# Patient Record
Sex: FEMALE | Race: BLACK OR AFRICAN AMERICAN | NOT HISPANIC OR LATINO | Employment: STUDENT | URBAN - METROPOLITAN AREA
[De-identification: names, ages, dates, MRNs, and addresses within clinical notes are randomized per-mention and may not be internally consistent; named-entity substitution may affect disease eponyms.]

---
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
## 2023-02-14 ENCOUNTER — TELEPHONE (OUTPATIENT)
Dept: FAMILY MEDICINE | Facility: CLINIC | Age: 18
End: 2023-02-14
Payer: COMMERCIAL

## 2023-02-14 NOTE — TELEPHONE ENCOUNTER
Order/Referral Request    Who is requesting: Patrice Bradley     Orders being requested: Antibody testing Titer labs for vaccines     Reason service is needed/diagnosis: School requirement    When are orders needed by: 2/16/2023    Has this been discussed with Provider: No    Does patient have a preference on a Group/Provider/Facility? [facility]    Does patient have an appointment scheduled?: Yes: for labs    Where to send orders: Place orders within Epic    Could we send this information to you in [employer] or would you prefer to receive a phone call?:   Patient would prefer a phone call   Okay to leave a detailed message?: Yes at Cell number on file:    Telephone Information:   Mobile [phone]

## 2023-02-15 ENCOUNTER — DOCUMENTATION ONLY (OUTPATIENT)
Dept: LAB | Facility: CLINIC | Age: 18
End: 2023-02-15
Payer: COMMERCIAL

## 2023-02-15 NOTE — PROGRESS NOTES
Please see telephone call from yesterday. Patient at minimum would need an E visit with a copy of the form so we can order the correct tests.   Sharon Nunez PA-C

## 2023-02-15 NOTE — TELEPHONE ENCOUNTER
"\"Please see telephone call from yesterday. Patient at minimum would need an E visit with a copy of the form so we can order the correct tests.   Sharon Nunez PA-C\"    Team- Please assist patient in scheduling E-visit and have patient bring copy of form for requested titers.    Thanks,  STEPHANIE Francois RN  [facility]    "

## 2023-02-18 ENCOUNTER — MYC MEDICAL ADVICE (OUTPATIENT)
Dept: FAMILY MEDICINE | Facility: CLINIC | Age: 18
End: 2023-02-18
Payer: COMMERCIAL

## 2023-02-19 ENCOUNTER — E-VISIT (OUTPATIENT)
Dept: FAMILY MEDICINE | Facility: CLINIC | Age: 18
End: 2023-02-19
Payer: COMMERCIAL

## 2023-02-19 DIAGNOSIS — Z23 NEED FOR IMMUNIZATION AGAINST MEASLES: Primary | ICD-10-CM

## 2023-02-19 DIAGNOSIS — Z11.59 SCREENING FOR VIRAL DISEASE: ICD-10-CM

## 2023-02-19 PROCEDURE — 99421 OL DIG E/M SVC 5-10 MIN: CPT | Performed by: PHYSICIAN ASSISTANT

## 2023-02-20 ENCOUNTER — TELEPHONE (OUTPATIENT)
Dept: FAMILY MEDICINE | Facility: CLINIC | Age: 18
End: 2023-02-20
Payer: COMMERCIAL

## 2023-02-20 NOTE — TELEPHONE ENCOUNTER
Patient Quality Outreach    Patient is due for the following:   Physical Preventive Adult Physical      Topic Date Due     HPV Vaccine (2 - 3-dose series) 03/23/2020     COVID-19 Vaccine (3 - Booster for Pfizer series) 08/31/2021       Next Steps:   Patient has upcoming appointment, these items will be addressed at that time.     Type of outreach:    Chart review performed, no outreach needed.      Questions for provider review:    None     Tiffanie Winkler CMA  Chart routed to Care Team.

## 2023-02-21 ENCOUNTER — ALLIED HEALTH/NURSE VISIT (OUTPATIENT)
Dept: FAMILY MEDICINE | Facility: CLINIC | Age: 18
End: 2023-02-21
Payer: COMMERCIAL

## 2023-02-21 DIAGNOSIS — Z23 NEED FOR VACCINATION: Primary | ICD-10-CM

## 2023-02-21 PROCEDURE — 90707 MMR VACCINE SC: CPT

## 2023-02-21 PROCEDURE — 90471 IMMUNIZATION ADMIN: CPT

## 2023-02-21 PROCEDURE — 99207 PR NO CHARGE NURSE ONLY: CPT

## 2023-02-21 PROCEDURE — 90472 IMMUNIZATION ADMIN EACH ADD: CPT

## 2023-02-21 PROCEDURE — 90746 HEPB VACCINE 3 DOSE ADULT IM: CPT

## 2023-02-21 NOTE — PROGRESS NOTES
Prior to immunization administration, verified patients identity using patient s name and date of birth. Please see Immunization Activity for additional information.     Screening Questionnaire for Adult Immunization    Are you sick today?   No   Do you have allergies to medications, food, a vaccine component or latex?   No   Have you ever had a serious reaction after receiving a vaccination?   No   Do you have a long-term health problem with heart, lung, kidney, or metabolic disease (e.g., diabetes), asthma, a blood disorder, no spleen, complement component deficiency, a cochlear implant, or a spinal fluid leak?  Are you on long-term aspirin therapy?   No   Do you have cancer, leukemia, HIV/AIDS, or any other immune system problem?   No   Do you have a parent, brother, or sister with an immune system problem?   No   In the past 3 months, have you taken medications that affect  your immune system, such as prednisone, other steroids, or anticancer drugs; drugs for the treatment of rheumatoid arthritis, Crohn s disease, or psoriasis; or have you had radiation treatments?   No   Have you had a seizure, or a brain or other nervous system problem?   No   During the past year, have you received a transfusion of blood or blood    products, or been given immune (gamma) globulin or antiviral drug?   No   For women: Are you pregnant or is there a chance you could become       pregnant during the next month?   No   Have you received any vaccinations in the past 4 weeks?   No     Immunization questionnaire answers were all negative.        Per orders of Freddie Quinones NP, injection of MMR and HepB #3 given by Kaylen Evans CMA. Patient instructed to remain in clinic for 15 minutes afterwards, and to report any adverse reaction to me immediately.       Screening performed by Kaylen Evans CMA on 2/21/2023 at 10:01 AM.

## 2023-03-07 ENCOUNTER — OFFICE VISIT (OUTPATIENT)
Dept: FAMILY MEDICINE | Facility: CLINIC | Age: 18
End: 2023-03-07
Payer: COMMERCIAL

## 2023-03-07 VITALS
BODY MASS INDEX: 26.96 KG/M2 | DIASTOLIC BLOOD PRESSURE: 77 MMHG | HEART RATE: 86 BPM | TEMPERATURE: 98.7 F | OXYGEN SATURATION: 98 % | SYSTOLIC BLOOD PRESSURE: 111 MMHG | WEIGHT: 182 LBS | RESPIRATION RATE: 19 BRPM | HEIGHT: 69 IN

## 2023-03-07 DIAGNOSIS — R61 GENERALIZED HYPERHIDROSIS: ICD-10-CM

## 2023-03-07 DIAGNOSIS — Z00.00 ROUTINE HISTORY AND PHYSICAL EXAMINATION OF ADULT: Primary | ICD-10-CM

## 2023-03-07 DIAGNOSIS — Z28.39 IMMUNIZATION DEFICIENCY: ICD-10-CM

## 2023-03-07 PROCEDURE — 90651 9VHPV VACCINE 2/3 DOSE IM: CPT | Performed by: PHYSICIAN ASSISTANT

## 2023-03-07 PROCEDURE — 99213 OFFICE O/P EST LOW 20 MIN: CPT | Mod: 25 | Performed by: PHYSICIAN ASSISTANT

## 2023-03-07 PROCEDURE — 99395 PREV VISIT EST AGE 18-39: CPT | Mod: 25 | Performed by: PHYSICIAN ASSISTANT

## 2023-03-07 PROCEDURE — 90471 IMMUNIZATION ADMIN: CPT | Performed by: PHYSICIAN ASSISTANT

## 2023-03-07 ASSESSMENT — ENCOUNTER SYMPTOMS
HEADACHES: 0
NAUSEA: 0
PALPITATIONS: 0
ROS SKIN COMMENTS: EXCESSIVE SWEATING
JOINT SWELLING: 0
PARESTHESIAS: 0
CHILLS: 0
DIZZINESS: 0
WEAKNESS: 0
EYE PAIN: 0
SHORTNESS OF BREATH: 0
HEMATOCHEZIA: 0
FEVER: 0
DIARRHEA: 0
HEMATURIA: 0
NERVOUS/ANXIOUS: 0
MYALGIAS: 0
SORE THROAT: 0
COUGH: 0
HEARTBURN: 0
ARTHRALGIAS: 0
CONSTIPATION: 0
BREAST MASS: 0
DYSURIA: 0
ABDOMINAL PAIN: 0
FREQUENCY: 0

## 2023-03-07 NOTE — PROGRESS NOTES
SUBJECTIVE:   CC: Patrice is an 17 year old who presents for preventive health visit.     Patient has been advised of split billing requirements and indicates understanding: Yes  Healthy Habits:     Getting at least 3 servings of Calcium per day:  NO    Bi-annual eye exam:  Yes    Dental care twice a year:  NO    Sleep apnea or symptoms of sleep apnea:  None    Diet:  Regular (no restrictions) and Breakfast skipped    Frequency of exercise:  1 day/week    Duration of exercise:  30-45 minutes    Taking medications regularly:  Yes    Medication side effects:  None    PHQ-2 Total Score: 1    Additional concerns today:  No              Today's PHQ-2 Score:   PHQ-2 ( 1999 Pfizer) 3/7/2023   Q1: Little interest or pleasure in doing things 1   Q2: Feeling down, depressed or hopeless 0   PHQ-2 Score 1   PHQ-2 Total Score (12-17 Years)- Positive if 3 or more points; Administer PHQ-A if positive -   Q1: Little interest or pleasure in doing things Several days   Q2: Feeling down, depressed or hopeless Not at all   PHQ-2 Score 1       Have you ever done Advance Care Planning? (For example, a Health Directive, POLST, or a discussion with a medical provider or your loved ones about your wishes): No, advance care planning information given to patient to review.  Patient plans to discuss their wishes with loved ones or provider.      Social History     Tobacco Use     Smoking status: Never     Smokeless tobacco: Never   Substance Use Topics     Alcohol use: No         Alcohol Use 3/7/2023   Prescreen: >3 drinks/day or >7 drinks/week? Not Applicable   No flowsheet data found.    Reviewed orders with patient.  Reviewed health maintenance and updated orders accordingly - Yes  Labs reviewed in EPIC    Breast Cancer Screening:        History of abnormal Pap smear: NO - under age 21, PAP not appropriate for age     Reviewed and updated as needed this visit by clinical staff   Tobacco  Allergies  Meds    Surg Hx  Fam Hx       "    Reviewed and updated as needed this visit by Provider    Allergies     Surg Hx  Fam Hx             Review of Systems   Constitutional: Negative for chills and fever.   HENT: Positive for tinnitus. Negative for congestion, ear pain, hearing loss and sore throat.    Eyes: Negative for pain and visual disturbance.   Respiratory: Negative for cough and shortness of breath.    Cardiovascular: Negative for chest pain, palpitations and peripheral edema.   Gastrointestinal: Negative for abdominal pain, constipation, diarrhea, heartburn, hematochezia and nausea.   Breasts:  Negative for tenderness, breast mass and discharge.   Genitourinary: Positive for vaginal pain (skin around vaginal sensitive ). Negative for dysuria, frequency, genital sores, hematuria, pelvic pain, urgency, vaginal bleeding and vaginal discharge.   Musculoskeletal: Negative for arthralgias, joint swelling and myalgias.   Skin: Negative for rash.        excessive sweating    Neurological: Negative for dizziness, weakness, headaches and paresthesias.   Psychiatric/Behavioral: Negative for mood changes. The patient is not nervous/anxious.               OBJECTIVE:   /77   Pulse 86   Temp 98.7  F (37.1  C) (Oral)   Resp 19   Ht 1.74 m (5' 8.5\")   Wt 82.6 kg (182 lb)   LMP 02/11/2023   SpO2 98%   BMI 27.27 kg/m    Physical Exam  Constitutional:       General: She is not in acute distress.     Appearance: She is well-developed.   HENT:      Right Ear: Tympanic membrane, ear canal and external ear normal.      Left Ear: Tympanic membrane, ear canal and external ear normal.      Mouth/Throat:      Pharynx: No oropharyngeal exudate.   Eyes:      Conjunctiva/sclera: Conjunctivae normal.   Neck:      Thyroid: No thyromegaly.   Cardiovascular:      Rate and Rhythm: Normal rate and regular rhythm.      Heart sounds: Normal heart sounds.   Pulmonary:      Effort: Pulmonary effort is normal.      Breath sounds: Normal breath sounds.   Abdominal: "      General: Bowel sounds are normal.      Palpations: Abdomen is soft.      Tenderness: There is no abdominal tenderness.   Lymphadenopathy:      Cervical: No cervical adenopathy.   Skin:     General: Skin is warm and dry.      Findings: No rash.   Neurological:      Mental Status: She is alert and oriented to person, place, and time.   Psychiatric:         Behavior: Behavior normal.           Diagnostic Test Results:  Labs reviewed in Epic    ASSESSMENT/PLAN:   (Z00.00) Routine history and physical examination of adult  (primary encounter diagnosis)  Comment:   Plan: overall healthy.  Encouraged increase in exercise   Monitor tinnitus.       (Z28.39) Immunization deficiency  Comment:   Plan: Mumps Immune Status, IgG, Hepatitis B Surface         Antibody        Titers were negative before.  Got vaccines again.  Recheck in a month labs for school    (R61) Generalized hyperhidrosis  Comment:   Plan: aluminum chloride (DRYSOL) 20 % external         Solution  Apply to hands.          Follow up as needed            COUNSELING:  Reviewed preventive health counseling, as reflected in patient instructions       Regular exercise       Healthy diet/nutrition        She reports that she has never smoked. She has never used smokeless tobacco.          Carmencita Serrato PA-C  [facility]

## 2023-03-28 ENCOUNTER — LAB (OUTPATIENT)
Dept: LAB | Facility: CLINIC | Age: 18
End: 2023-03-28
Payer: COMMERCIAL

## 2023-03-28 DIAGNOSIS — Z28.39 IMMUNIZATION DEFICIENCY: ICD-10-CM

## 2023-03-28 DIAGNOSIS — Z11.3 SCREENING FOR STDS (SEXUALLY TRANSMITTED DISEASES): Primary | ICD-10-CM

## 2023-03-28 DIAGNOSIS — Z11.4 SCREENING FOR HIV (HUMAN IMMUNODEFICIENCY VIRUS): ICD-10-CM

## 2023-03-28 DIAGNOSIS — Z11.59 SCREENING FOR VIRAL DISEASE: ICD-10-CM

## 2023-03-28 DIAGNOSIS — Z11.59 NEED FOR HEPATITIS C SCREENING TEST: ICD-10-CM

## 2023-03-28 LAB
HBV SURFACE AB SERPL IA-ACNC: 330.05 M[IU]/ML
HBV SURFACE AB SERPL IA-ACNC: 337.86 M[IU]/ML
HBV SURFACE AB SERPL IA-ACNC: REACTIVE M[IU]/ML
HBV SURFACE AB SERPL IA-ACNC: REACTIVE M[IU]/ML
HCV AB SERPL QL IA: NONREACTIVE
HIV 1+2 AB+HIV1 P24 AG SERPL QL IA: NONREACTIVE
MUMPS ANTIBODY IGG INSTRUMENT VALUE: 49.7 AU/ML
MUV IGG SER QL IA: POSITIVE

## 2023-03-28 PROCEDURE — 86735 MUMPS ANTIBODY: CPT

## 2023-03-28 PROCEDURE — 86803 HEPATITIS C AB TEST: CPT

## 2023-03-28 PROCEDURE — 86706 HEP B SURFACE ANTIBODY: CPT

## 2023-03-28 PROCEDURE — 86765 RUBEOLA ANTIBODY: CPT

## 2023-03-28 PROCEDURE — 87389 HIV-1 AG W/HIV-1&-2 AB AG IA: CPT

## 2023-03-28 PROCEDURE — 36415 COLL VENOUS BLD VENIPUNCTURE: CPT

## 2023-03-28 PROCEDURE — 86706 HEP B SURFACE ANTIBODY: CPT | Mod: 59

## 2023-03-28 NOTE — RESULT ENCOUNTER NOTE
Adna,    You are immune to hepatitis B and mumps from prior vaccination.     Best,  Kamilah Geiger MD

## 2023-03-29 ENCOUNTER — TELEPHONE (OUTPATIENT)
Dept: FAMILY MEDICINE | Facility: CLINIC | Age: 18
End: 2023-03-29

## 2023-03-29 ENCOUNTER — E-VISIT (OUTPATIENT)
Dept: FAMILY MEDICINE | Facility: CLINIC | Age: 18
End: 2023-03-29
Payer: COMMERCIAL

## 2023-03-29 DIAGNOSIS — Z23 NEED FOR VACCINATION: Primary | ICD-10-CM

## 2023-03-29 LAB
MEV IGG SER IA-ACNC: 10.7 AU/ML
MEV IGG SER IA-ACNC: NORMAL

## 2023-03-29 PROCEDURE — 99421 OL DIG E/M SVC 5-10 MIN: CPT | Performed by: FAMILY MEDICINE

## 2023-03-29 NOTE — RESULT ENCOUNTER NOTE
Adna,     Your HIV and Hepatitis C screenings were negative.   Your are not immune to Measles  You are immune to Hepatitis B.   Sharon Nunez PA-C

## 2023-03-29 NOTE — TELEPHONE ENCOUNTER
"Patient had submitted an Evisit today to get a referral to immunologist-    \"I keep testing not immune on the measles titer even though i got a vaccine  i was wondering ir i could get a referral to see an immunologist\"    Kamilah Engel wrote a letter for patient regarding being fully vaccinated against MMR.    Patient would like referral to immunologist  Please advise    Holger Razo RN  [facility] [place]        "

## 2023-03-29 NOTE — PATIENT INSTRUCTIONS
Patrice,    Thank you for choosing us for your care. I have reviewed your chart and see that you are considered fully vaccinated against MMR. I have created a letter for you to present to your school or requiring body.     Click here to access your letters. [email or web address]  (If unable to click the link, copy and paste the text into your browser)     It is possible that we have not waited long enough since your last booster shot to see your vaccine titer turn positive, so I have placed a lab order for you to have your lab retested.     I have placed the orders. Please schedule an appointment with the lab right here in [employer], or call [phone].     Kamilah Geiger MD

## 2023-03-29 NOTE — LETTER
3/29/2023          Patrice Bradley  [address] [address]    [id number]    To whom is may concern,    Patrice Bradley  2005 is a patient of our clinic and is fully immunized against MMR, with prior vaccine dates of 04, 08, and 23.     Sincerely,        Kamilah Geiger MD  Dept of Family Practice

## 2023-03-30 ENCOUNTER — E-VISIT (OUTPATIENT)
Dept: FAMILY MEDICINE | Facility: CLINIC | Age: 18
End: 2023-03-30
Payer: COMMERCIAL

## 2023-03-30 DIAGNOSIS — Z28.39 IMMUNIZATION DEFICIENCY: Primary | ICD-10-CM

## 2023-03-30 PROCEDURE — 99421 OL DIG E/M SVC 5-10 MIN: CPT | Performed by: PHYSICIAN ASSISTANT

## 2023-04-01 NOTE — TELEPHONE ENCOUNTER
DIAGNOSIS: per patient Immunization deficiency [Z28.39] Dr Nunez records in [facility]   DATE RECEIVED: 4.5.23   NOTES (Gather within 2 years) STATUS DETAILS   OFFICE NOTE from referring provider   Internal 3.30.23, 2.19.23  Mayra  FP   OFFICE NOTE from other specialist Internal 3.7.23  Ama MCGARRY   DISCHARGE SUMMARY from hospital     DISCHARGE REPORT from the ER     LABS (any labs) Internal    MEDICATION LIST Internal    IMAGING  (NEED IMAGES AND REPORTS)     Osteomyelitis: Foot imaging      Liver Abscess: Abdominal imagineg     Other (anything related to diagnoses

## 2023-04-05 ENCOUNTER — TELEPHONE (OUTPATIENT)
Dept: INFECTIOUS DISEASES | Facility: CLINIC | Age: 18
End: 2023-04-05
Payer: COMMERCIAL

## 2023-04-05 ENCOUNTER — PRE VISIT (OUTPATIENT)
Dept: INFECTIOUS DISEASES | Facility: CLINIC | Age: 18
End: 2023-04-05
Payer: COMMERCIAL

## 2023-04-05 NOTE — TELEPHONE ENCOUNTER
Called patient to discuss. Patient upset as she needed a letter provided today by a provider. Apologized for the error and provided her with correct number to schedule.

## 2023-04-05 NOTE — TELEPHONE ENCOUNTER
----- Message from Randy Link MD sent at 2023  6:53 PM CDT -----  Regardin23 Clinic schedule  Hi I was reviewing my schedule for 23 and saw that the patient Patrice Bradley at 1pm was referred to see an immunologist, not an ID (infectious diseases) referral. They will either need to be rescheduled with an allergy/immunology provider or with an immunologist (ie [facility], etc) as I am not am immunologist and thus not the appropriate provider to see her.     Thanks,  Valdemar Link

## 2023-04-20 ENCOUNTER — LAB (OUTPATIENT)
Dept: LAB | Facility: CLINIC | Age: 18
End: 2023-04-20
Payer: COMMERCIAL

## 2023-04-20 DIAGNOSIS — R76.9 ABNORMAL IMMUNOLOGICAL FINDING IN SERUM: Primary | ICD-10-CM

## 2023-04-20 DIAGNOSIS — R76.9 ABNORMAL IMMUNOLOGICAL FINDING IN SERUM: ICD-10-CM

## 2023-04-20 DIAGNOSIS — Z23 NEED FOR VACCINATION: ICD-10-CM

## 2023-04-20 LAB
BASOPHILS # BLD AUTO: 0 10E3/UL (ref 0–0.2)
BASOPHILS NFR BLD AUTO: 0 %
EOSINOPHIL # BLD AUTO: 0.1 10E3/UL (ref 0–0.7)
EOSINOPHIL NFR BLD AUTO: 1 %
ERYTHROCYTE [DISTWIDTH] IN BLOOD BY AUTOMATED COUNT: 14.9 % (ref 10–15)
HCT VFR BLD AUTO: 39.3 % (ref 35–47)
HGB BLD-MCNC: 12.7 G/DL (ref 11.7–15.7)
LYMPHOCYTES # BLD AUTO: 1.3 10E3/UL (ref 0.8–5.3)
LYMPHOCYTES NFR BLD AUTO: 26 %
MCH RBC QN AUTO: 28 PG (ref 26.5–33)
MCHC RBC AUTO-ENTMCNC: 32.3 G/DL (ref 31.5–36.5)
MCV RBC AUTO: 87 FL (ref 78–100)
MONOCYTES # BLD AUTO: 0.4 10E3/UL (ref 0–1.3)
MONOCYTES NFR BLD AUTO: 7 %
NEUTROPHILS # BLD AUTO: 3.3 10E3/UL (ref 1.6–8.3)
NEUTROPHILS NFR BLD AUTO: 65 %
PLATELET # BLD AUTO: 357 10E3/UL (ref 150–450)
RBC # BLD AUTO: 4.53 10E6/UL (ref 3.8–5.2)
WBC # BLD AUTO: 5.1 10E3/UL (ref 4–11)

## 2023-04-20 PROCEDURE — 82787 IGG 1 2 3 OR 4 EACH: CPT

## 2023-04-20 PROCEDURE — 86317 IMMUNOASSAY INFECTIOUS AGENT: CPT | Mod: 90

## 2023-04-20 PROCEDURE — 82784 ASSAY IGA/IGD/IGG/IGM EACH: CPT

## 2023-04-20 PROCEDURE — 86787 VARICELLA-ZOSTER ANTIBODY: CPT | Mod: 90

## 2023-04-20 PROCEDURE — 86765 RUBEOLA ANTIBODY: CPT | Mod: 90

## 2023-04-20 PROCEDURE — 85025 COMPLETE CBC W/AUTO DIFF WBC: CPT

## 2023-04-20 PROCEDURE — 86787 VARICELLA-ZOSTER ANTIBODY: CPT | Mod: 59

## 2023-04-20 PROCEDURE — 99000 SPECIMEN HANDLING OFFICE-LAB: CPT

## 2023-04-20 PROCEDURE — 36415 COLL VENOUS BLD VENIPUNCTURE: CPT

## 2023-04-21 LAB
IGA SERPL-MCNC: 168 MG/DL (ref 84–499)
IGG SERPL-MCNC: 1448 MG/DL (ref 610–1616)
IGG1 SER-MCNC: 724 MG/DL (ref 382–929)
IGG2 SER-MCNC: 540 MG/DL (ref 242–700)
IGG3 SER-MCNC: 69 MG/DL (ref 22–176)
IGG4 SER-MCNC: 20 MG/DL (ref 4–86)
IGM SERPL-MCNC: 62 MG/DL (ref 35–242)
SUBCLASSES, PERCENT: 93 %
VZV IGG SER QL IA: 436.1 INDEX
VZV IGG SER QL IA: POSITIVE

## 2023-04-24 LAB
C DIPHTHERIAE IGG SER-ACNC: 0.9 IU/ML
C TETANI TOXOID IGG SERPL IA-ACNC: 1 IU/ML
MEV IGM SER IA-ACNC: 0.42 AU
S PN DA SERO 19F IGG SER-MCNC: 26.15 UG/ML
S PNEUM DA 1 IGG SER-MCNC: 9.5 UG/ML
S PNEUM DA 12F IGG SER-MCNC: 0.35 UG/ML
S PNEUM DA 14 IGG SER-MCNC: 14.44 UG/ML
S PNEUM DA 18C IGG SER-MCNC: 2.29 UG/ML
S PNEUM DA 23F IGG SER-MCNC: 1.2 UG/ML
S PNEUM DA 3 IGG SER-MCNC: 1.52 UG/ML
S PNEUM DA 4 IGG SER-MCNC: 0.51 UG/ML
S PNEUM DA 5 IGG SER-MCNC: 0.74 UG/ML
S PNEUM DA 6B IGG SER-MCNC: 0.9 UG/ML
S PNEUM DA 7F IGG SER-MCNC: 2.09 UG/ML
S PNEUM DA 8 IGG SER-MCNC: 0.45 UG/ML
S PNEUM DA 9N IGG SER-MCNC: 3 UG/ML
S PNEUM DA 9V IGG SER-MCNC: 0.22 UG/ML
S PNEUM SEROTYPE IGG SER-IMP: NORMAL

## 2023-04-25 LAB — VZV IGM SER IA-ACNC: 0.05 ISR

## 2023-05-22 NOTE — PROGRESS NOTES
"Patrice Bradley is a 14 year old female who is being evaluated via a billable telephone visit.      The parent/guardian has been notified of following:     \"This telephone visit will be conducted via a call between you, your child and your child's physician/provider. We have found that certain health care needs can be provided without the need for a physical exam.  This service lets us provide the care you need with a short phone conversation.  If a prescription is necessary we can send it directly to your pharmacy.  If lab work is needed we can place an order for that and you can then stop by our lab to have the test done at a later time.    Telephone visits are billed at different rates depending on your insurance coverage. During this emergency period, for some insurers they may be billed the same as an in-person visit.  Please reach out to your insurance provider with any questions.    If during the course of the call the physician/provider feels a telephone visit is not appropriate, you will not be charged for this service.\"    Parent/guardian has given verbal consent for Telephone visit?  Yes    What phone number would you like to be contacted at? [phone]    How would you like to obtain your AVS? Mail a copy    Phone call duration: 21 minutes    Patrice Bradley is a 14 year old female who c/o painful periods.  Her mother, Ms. Alexandra, joins us on today's call.  Menarche age 9, regular menses.  She describes \"bad cramps\", nausea and vomiting (has tried zofran but vomits anyway and then cannot keep ibuprofen/naproxen down), becomes constipated.  Not sexually active.  No risk factors to preclude use of oral contraceptive pills.     We discussed dysmenorrhea, options of treatment.  We discussed risks/benefits of hormonal contraception, including oral contraceptive pills and Depo-provera.  Questions answered.  She and her mother wish that she begin a trial of oral contraceptive pills.  She will start with her next " period, monitor symptoms, and call as needed.  Zofran also refilled at her request.     ASSESSMENT: Dysmenorrhea.  Nausea.     PLAN: As above.     Jackeline Recinos MD        no

## 2023-06-27 ENCOUNTER — OFFICE VISIT (OUTPATIENT)
Dept: FAMILY MEDICINE | Facility: CLINIC | Age: 18
End: 2023-06-27
Payer: COMMERCIAL

## 2023-06-27 VITALS
TEMPERATURE: 98.3 F | DIASTOLIC BLOOD PRESSURE: 64 MMHG | SYSTOLIC BLOOD PRESSURE: 97 MMHG | BODY MASS INDEX: 25.92 KG/M2 | HEART RATE: 97 BPM | HEIGHT: 69 IN | OXYGEN SATURATION: 99 % | RESPIRATION RATE: 19 BRPM | WEIGHT: 175 LBS

## 2023-06-27 DIAGNOSIS — R11.0 NAUSEA: ICD-10-CM

## 2023-06-27 DIAGNOSIS — R10.2 PELVIC PAIN IN FEMALE: ICD-10-CM

## 2023-06-27 DIAGNOSIS — N94.6 DYSMENORRHEA: ICD-10-CM

## 2023-06-27 DIAGNOSIS — Z00.00 ROUTINE HISTORY AND PHYSICAL EXAMINATION OF ADULT: Primary | ICD-10-CM

## 2023-06-27 PROBLEM — R11.10 REGURGITATION OF FOOD: Status: RESOLVED | Noted: 2021-01-18 | Resolved: 2023-06-27

## 2023-06-27 PROCEDURE — 99395 PREV VISIT EST AGE 18-39: CPT | Performed by: PHYSICIAN ASSISTANT

## 2023-06-27 RX ORDER — METOCLOPRAMIDE 5 MG/1
5 TABLET ORAL 4 TIMES DAILY PRN
Qty: 30 TABLET | Refills: 11 | Status: SHIPPED | OUTPATIENT
Start: 2023-06-27 | End: 2024-06-28

## 2023-06-27 RX ORDER — NAPROXEN 250 MG/1
250-500 TABLET ORAL 2 TIMES DAILY PRN
Qty: 90 TABLET | Refills: 4 | Status: SHIPPED | OUTPATIENT
Start: 2023-06-27

## 2023-06-27 RX ORDER — VITAMIN B COMPLEX
2 TABLET ORAL DAILY
COMMUNITY

## 2023-06-27 SDOH — ECONOMIC STABILITY: TRANSPORTATION INSECURITY
IN THE PAST 12 MONTHS, HAS THE LACK OF TRANSPORTATION KEPT YOU FROM MEDICAL APPOINTMENTS OR FROM GETTING MEDICATIONS?: NO

## 2023-06-27 SDOH — ECONOMIC STABILITY: FOOD INSECURITY: WITHIN THE PAST 12 MONTHS, THE FOOD YOU BOUGHT JUST DIDN'T LAST AND YOU DIDN'T HAVE MONEY TO GET MORE.: NEVER TRUE

## 2023-06-27 SDOH — ECONOMIC STABILITY: FOOD INSECURITY: WITHIN THE PAST 12 MONTHS, YOU WORRIED THAT YOUR FOOD WOULD RUN OUT BEFORE YOU GOT MONEY TO BUY MORE.: NEVER TRUE

## 2023-06-27 SDOH — ECONOMIC STABILITY: INCOME INSECURITY: IN THE LAST 12 MONTHS, WAS THERE A TIME WHEN YOU WERE NOT ABLE TO PAY THE MORTGAGE OR RENT ON TIME?: NO

## 2023-06-27 NOTE — PROGRESS NOTES
Preventive Care Visit  [facility] REYNA Serrato PA-C, Family Medicine  Jun 27, 2023  {Provider  Link to [facility] SmartSet :670476}  Assessment & Plan   17 year old, here for preventive care.    {Diagnosis Options:374292}  Patient has been advised of split billing requirements and indicates understanding: Yes  Growth      {GROWTH:160591}    Immunizations   {Vaccine counseling is expected when vaccines are given for the first time.   Vaccine counseling would not be expected for subsequent vaccines (after the first of the series) unless there is significant additional documentation:872209}MenB Vaccine {MenB Vaccine:491663}    Anticipatory Guidance    Reviewed age appropriate anticipatory guidance.   {ANTICIPATORY 15-18 Y (Optional):234646}  {Link to Communication Management (Letters) :624335}  {Cleared for sports (Optional):847260}    Referrals/Ongoing Specialty Care  {Referrals/Ongoing Specialty Care:237696}  Verbal Dental Referral: {C&TC REQUIRED at eruption of first tooth or 12 mo:444876}    Dyslipidemia Follow Up:  { :686901}    Subjective     ***       No data to display                  6/27/2023    11:18 AM   Social   Lives with Family   Recent potential stressors None   History of trauma No   Family Hx of mental health challenges No   Lack of transportation has limited access to appts/meds No   Difficulty paying mortgage/rent on time No   Lack of steady place to sleep/has slept in a shelter No         6/27/2023    11:18 AM   Health Risks/Safety   Do you always wear a seat belt? Yes   Helmet use? Yes            6/27/2023    11:18 AM   TB Screening: Consider immunosuppression as a risk factor for TB   Recent TB infection or positive TB test in family/close contacts No   Recent travel outside [place] (you/family/close contacts) No   Recent residence in high-risk group setting (correctional facility/health care facility/homeless shelter/refugee camp) No          6/27/2023    11:18 AM    Dyslipidemia   FH: premature cardiovascular disease No, these conditions are not present in the patient's biologic parents or grandparents   FH: hyperlipidemia (!) YES   Personal risk factors for heart disease NO diabetes, high blood pressure, obesity, smokes cigarettes, kidney problems, heart or kidney transplant, history of Kawasaki disease with an aneurysm, lupus, rheumatoid arthritis, or HIV     Recent Labs   Lab Test 05/24/22  1411   CHOL 186*   HDL 67      TRIG 51     {Universal Screening with fasting or non-fasting lipid panel recommended once between 17-21 yrs old  Link to Expert Panel on Integrated Guidelines for Cardiovascular Health and Risk Reduction in Children and Adolescents Summary Report :145322}      6/27/2023    11:18 AM   Diet   What type of water? (!) BOTTLED   In past 12 months, concerned food might run out Never true   In past 12 months, food has run out/couldn't afford more Never true         6/27/2023    11:18 AM   Diet   Do you have questions about your eating?  No   Do you have questions about your weight?  No   What do you regularly drink? Water   What type of water? (!) BOTTLED   Do you think you eat healthy foods? Yes   At least 3 servings of food or beverages that have calcium each day? Yes   How would you describe your diet?  No restrictions    (!) BREAKFAST SKIPPED   In past 12 months, concerned food might run out Never true   In past 12 months, food has run out/couldn't afford more Never true         6/27/2023    11:18 AM   Activity   Days per week of moderate/strenuous exercise 1 day   On average, how many minutes do you engage in exercise at this level? 60 minutes   What do you do for exercise? swim   What activities are you involved with? n/a         6/27/2023    11:18 AM   Media Use   Hours per day of screen time (for entertainment) 2         6/27/2023    11:18 AM   Sleep   Do you have any trouble with sleep? No         6/27/2023    11:18 AM   School   Are you in  "school? Yes   What school do you attend?  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  Regular          Objective     Exam  BP 97/64   Pulse 97   Temp 98.3  F (36.8  C) (Oral)   Resp 19   Ht 1.74 m (5' 8.5\")   Wt 79.4 kg (175 lb)   LMP 06/10/2023   SpO2 99%   BMI 26.22 kg/m    95 %ile (Z= 1.65) based on CDC (Girls, 2-20 Years) Stature-for-age data based on Stature recorded on 6/27/2023.  93 %ile (Z= 1.49) based on CDC (Girls, 2-20 Years) weight-for-age data using vitals from 6/27/2023.  84 %ile (Z= 1.00) based on CDC (Girls, 2-20 Years) BMI-for-age based on BMI available as of 6/27/2023.  Blood pressure %rhonda are not available for patients who are 18 years or older.    Vision Screen  Vision Screen Details  Reason Vision Screen Not Completed: Patient had exam in last 12 months    Hearing Screen  RIGHT EAR  1000 Hz on Level 40 dB (Conditioning sound): Pass  1000 Hz on Level 20 dB: Pass  2000 Hz on Level 20 dB: Pass  4000 Hz on Level 20 dB: Pass  6000 Hz on Level 20 dB: Pass  8000 Hz on Level 20 dB: Pass  LEFT EAR  8000 Hz on Level 20 dB: Pass  6000 Hz on Level 20 dB: Pass  4000 Hz on Level 20 dB: Pass  2000 Hz on Level 20 dB: Pass  1000 Hz on Level 20 dB: Pass  500 Hz on Level 25 dB: Pass  RIGHT EAR  500 Hz on Level 25 dB: Pass  Results  Hearing Screen Results: Pass  {Provider  View Vision and Hearing Results :020584}  {Reference  Recommended Vision and Hearing Follow-Up :681458}  Physical Exam  {TEEN GENERAL EXAM 9 - 18 Y:269435::\"GENERAL: Active, alert, in no acute distress.\",\"SKIN: Clear. No significant rash, abnormal " "pigmentation or lesions\",\"HEAD: Normocephalic\",\"EYES: Pupils equal, round, reactive, Extraocular muscles intact. Normal conjunctivae.\",\"EARS: Normal canals. Tympanic membranes are normal; gray and translucent.\",\"NOSE: Normal without discharge.\",\"MOUTH/THROAT: Clear. No oral lesions. Teeth without obvious abnormalities.\",\"NECK: Supple, no masses.  No thyromegaly.\",\"LYMPH NODES: No adenopathy\",\"LUNGS: Clear. No rales, rhonchi, wheezing or retractions\",\"HEART: Regular rhythm. Normal S1/S2. No murmurs. Normal pulses.\",\"ABDOMEN: Soft, non-tender, not distended, no masses or hepatosplenomegaly. Bowel sounds normal. \",\"NEUROLOGIC: No focal findings. Cranial nerves grossly intact: DTR's normal. Normal gait, strength and tone\",\"BACK: Spine is straight, no scoliosis.\",\"EXTREMITIES: Full range of motion, no deformities\"}  { EXAM- Documentation REQUIRED for C&TC:648864}  {Sports Exam Musculoskeletal (Optional):993120::\" \",\"No Marfan stigmata: kyphoscoliosis, high-arched palate, pectus excavatuM, arachnodactyly, arm span > height, hyperlaxity, myopia, MVP, aortic insufficieny)\",\"Eyes: normal fundoscopic and pupils\",\"Cardiovascular: normal PMI, simultaneous femoral/radial pulses, no murmurs (standing, supine, Valsalva)\",\"Skin: no HSV, MRSA, tinea corporis\",\"Musculoskeletal\",\"  Neck: normal\",\"  Back: normal\",\"  Shoulder/arm: normal\",\"  Elbow/forearm: normal\",\"  Wrist/hand/fingers: normal\",\"  Hip/thigh: normal\",\"  Knee: normal\",\"  Leg/ankle: normal\",\"  Foot/toes: normal\",\"  Functional (Single Leg Hop or Squat): normal\"}    Prior to immunization administration, verified patients identity using patient s name and date of birth. Please see Immunization Activity for additional information.     Screening Questionnaire for Adult Immunization     Are you sick today?   { :352104::\"No\"}   Do you have allergies to medications, food, a vaccine component or latex?   { :546657::\"No\"}   Have you ever had a serious reaction after receiving a " "vaccination?   { :260205::\"No\"}   Do you have a long-term health problem with heart, lung, kidney, or metabolic disease (e.g., diabetes), asthma, a blood disorder, no spleen, complement component deficiency, a cochlear implant, or a spinal fluid leak?  Are you on long-term aspirin therapy?   { :020882::\"No\"}   Do you have cancer, leukemia, HIV/AIDS, or any other immune system problem?   { :858717::\"No\"}   Do you have a parent, brother, or sister with an immune system problem?   { :346912::\"No\"}   In the past 3 months, have you taken medications that affect  your immune system, such as prednisone, other steroids, or anticancer drugs; drugs for the treatment of rheumatoid arthritis, Crohn s disease, or psoriasis; or have you had radiation treatments?   { :389250::\"No\"}   Have you had a seizure, or a brain or other nervous system problem?   { :434768::\"No\"}   During the past year, have you received a transfusion of blood or blood    products, or been given immune (gamma) globulin or antiviral drug?   { :251456::\"No\"}   For women: Are you pregnant or is there a chance you could become       pregnant during the next month?   { :827283::\"No\"}   Have you received any vaccinations in the past 4 weeks?   { :683654::\"No\"}     Immunization questionnaire { :543817::\"answers were all negative.\"}      Patient instructed to remain in clinic for 15 minutes afterwards, and to report any adverse reactions.     Screening performed by Vahe Martin CMA on 6/27/2023 at 11:41 AM.    Carmencita Serrato PA-C  [facility]"

## 2023-06-27 NOTE — PROGRESS NOTES
Preventive Care Visit  [facility] REYNA Serrato PA-C, Family Medicine  Jun 27, 2023  {Provider  Link to [facility] SmartSet :512798}  Assessment & Plan   17 year old, here for preventive care.    {Diagnosis Options:882898}  {Patient advised of split billing (Optional):464775}  Growth      {GROWTH:398983}    Immunizations   {Vaccine counseling is expected when vaccines are given for the first time.   Vaccine counseling would not be expected for subsequent vaccines (after the first of the series) unless there is significant additional documentation:051993}MenB Vaccine {MenB Vaccine:843493}    Anticipatory Guidance    Reviewed age appropriate anticipatory guidance.   {ANTICIPATORY 15-18 Y (Optional):063368}  {Link to Communication Management (Letters) :865820}  {Cleared for sports (Optional):054626}    Referrals/Ongoing Specialty Care  {Referrals/Ongoing Specialty Care:476100}  Verbal Dental Referral: {C&TC REQUIRED at eruption of first tooth or 12 mo:958915}    Dyslipidemia Follow Up:  { :039150}    Subjective     ***       No data to display                  6/27/2023    11:18 AM   Social   Lives with Family   Recent potential stressors None   History of trauma No   Family Hx of mental health challenges No   Lack of transportation has limited access to appts/meds No   Difficulty paying mortgage/rent on time No   Lack of steady place to sleep/has slept in a shelter No         6/27/2023    11:18 AM   Health Risks/Safety   Do you always wear a seat belt? Yes   Helmet use? Yes            6/27/2023    11:18 AM   TB Screening: Consider immunosuppression as a risk factor for TB   Recent TB infection or positive TB test in family/close contacts No   Recent travel outside [place] (you/family/close contacts) No   Recent residence in high-risk group setting (correctional facility/health care facility/homeless shelter/refugee camp) No          6/27/2023    11:18 AM   Dyslipidemia   FH: premature  cardiovascular disease No, these conditions are not present in the patient's biologic parents or grandparents   FH: hyperlipidemia (!) YES   Personal risk factors for heart disease NO diabetes, high blood pressure, obesity, smokes cigarettes, kidney problems, heart or kidney transplant, history of Kawasaki disease with an aneurysm, lupus, rheumatoid arthritis, or HIV     Recent Labs   Lab Test 05/24/22  1411   CHOL 186*   HDL 67      TRIG 51     {Universal Screening with fasting or non-fasting lipid panel recommended once between 17-21 yrs old  Link to Expert Panel on Integrated Guidelines for Cardiovascular Health and Risk Reduction in Children and Adolescents Summary Report :867624}      6/27/2023    11:18 AM   Diet   What type of water? (!) BOTTLED   In past 12 months, concerned food might run out Never true   In past 12 months, food has run out/couldn't afford more Never true         6/27/2023    11:18 AM   Diet   Do you have questions about your eating?  No   Do you have questions about your weight?  No   What do you regularly drink? Water   What type of water? (!) BOTTLED   Do you think you eat healthy foods? Yes   At least 3 servings of food or beverages that have calcium each day? Yes   How would you describe your diet?  No restrictions    (!) BREAKFAST SKIPPED   In past 12 months, concerned food might run out Never true   In past 12 months, food has run out/couldn't afford more Never true         6/27/2023    11:18 AM   Activity   Days per week of moderate/strenuous exercise 1 day   On average, how many minutes do you engage in exercise at this level? 60 minutes   What do you do for exercise? swim   What activities are you involved with? n/a         6/27/2023    11:18 AM   Media Use   Hours per day of screen time (for entertainment) 2         6/27/2023    11:18 AM   Sleep   Do you have any trouble with sleep? No         6/27/2023    11:18 AM   School   Are you in school? Yes   What school do you  "attend?  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  Regular          Objective     Exam  There were no vitals taken for this visit.  No height on file for this encounter.  No weight on file for this encounter.  No height and weight on file for this encounter.  Blood pressure %rhonda are not available for patients who are 18 years or older.    Vision Screen       Hearing Screen     {Provider  View Vision and Hearing Results :580996}  {Reference  Recommended Vision and Hearing Follow-Up :492267}  Physical Exam  {TEEN GENERAL EXAM 9 - 18 Y:501165::\"GENERAL: Active, alert, in no acute distress.\",\"SKIN: Clear. No significant rash, abnormal pigmentation or lesions\",\"HEAD: Normocephalic\",\"EYES: Pupils equal, round, reactive, Extraocular muscles intact. Normal conjunctivae.\",\"EARS: Normal canals. Tympanic membranes are normal; gray and translucent.\",\"NOSE: Normal without discharge.\",\"MOUTH/THROAT: Clear. No oral lesions. Teeth without obvious abnormalities.\",\"NECK: Supple, no masses.  No thyromegaly.\",\"LYMPH NODES: No adenopathy\",\"LUNGS: Clear. No rales, rhonchi, wheezing or retractions\",\"HEART: Regular rhythm. Normal S1/S2. No murmurs. Normal pulses.\",\"ABDOMEN: Soft, non-tender, not distended, no masses or hepatosplenomegaly. Bowel sounds normal. \",\"NEUROLOGIC: No focal findings. Cranial nerves grossly intact: DTR's normal. Normal gait, strength and tone\",\"BACK: Spine is straight, no scoliosis.\",\"EXTREMITIES: Full range of motion, no deformities\"}  { EXAM- Documentation REQUIRED for C&TC:802134}  {Sports Exam Musculoskeletal " "(Optional):417692::\" \",\"No Marfan stigmata: kyphoscoliosis, high-arched palate, pectus excavatuM, arachnodactyly, arm span > height, hyperlaxity, myopia, MVP, aortic insufficieny)\",\"Eyes: normal fundoscopic and pupils\",\"Cardiovascular: normal PMI, simultaneous femoral/radial pulses, no murmurs (standing, supine, Valsalva)\",\"Skin: no HSV, MRSA, tinea corporis\",\"Musculoskeletal\",\"  Neck: normal\",\"  Back: normal\",\"  Shoulder/arm: normal\",\"  Elbow/forearm: normal\",\"  Wrist/hand/fingers: normal\",\"  Hip/thigh: normal\",\"  Knee: normal\",\"  Leg/ankle: normal\",\"  Foot/toes: normal\",\"  Functional (Single Leg Hop or Squat): normal\"}    {Immunization Screening- Place Screening for Ped Immunizations order or choose appropriate list to document responses in note (Optional):850267}  JASIEL Garcia [facility]  "

## 2023-06-27 NOTE — PROGRESS NOTES
Preventive Care Visit  [facility] REYNA Serrato PA-C, Family Medicine  Jun 27, 2023    Assessment & Plan   17 year old, here for preventive care.     (Z00.00) Routine history and physical examination of adult  (primary encounter diagnosis)  Comment:   Plan: overall healthy     (N94.6) Dysmenorrhea  Comment:   Plan: naproxen (NAPROSYN) 250 MG tablet        Refilled     (R10.2) Pelvic pain in female  Comment:   Plan: naproxen (NAPROSYN) 250 MG tablet        As above    (R11.0) Nausea  Comment:   Plan: metoclopramide (REGLAN) 5 MG tablet        As above, due to her periods     Patient has been advised of split billing requirements and indicates understanding: Yes  Growth      Normal height and weight    Immunizations   Patient/Parent(s) declined some/all vaccines today.  HPVMenB Vaccine not discussed.    Anticipatory Guidance    Reviewed age appropriate anticipatory guidance.     Future plans/ College        Referrals/Ongoing Specialty Care  None  Verbal Dental Referral: not addressed    Dyslipidemia Follow Up:  not needed    Subjective     heaches x 6 months-once monthly.  Sleep helps   chest pain x one month with turning or some activity.  She is not concerned about it      6/27/2023    11:49 AM   Additional Questions   Accompanied by self         6/27/2023    11:18 AM   Social   Lives with Family   Recent potential stressors None   History of trauma No   Family Hx of mental health challenges No   Lack of transportation has limited access to appts/meds No   Difficulty paying mortgage/rent on time No   Lack of steady place to sleep/has slept in a shelter No         6/27/2023    11:18 AM   Health Risks/Safety   Do you always wear a seat belt? Yes   Helmet use? Yes            6/27/2023    11:18 AM   TB Screening: Consider immunosuppression as a risk factor for TB   Recent TB infection or positive TB test in family/close contacts No   Recent travel outside [place] (you/family/close contacts)  No   Recent residence in high-risk group setting (correctional facility/health care facility/homeless shelter/refugee camp) No          6/27/2023    11:18 AM   Dyslipidemia   FH: premature cardiovascular disease No, these conditions are not present in the patient's biologic parents or grandparents   FH: hyperlipidemia (!) YES   Personal risk factors for heart disease NO diabetes, high blood pressure, obesity, smokes cigarettes, kidney problems, heart or kidney transplant, history of Kawasaki disease with an aneurysm, lupus, rheumatoid arthritis, or HIV     Recent Labs   Lab Test 05/24/22  1411   CHOL 186*   HDL 67      TRIG 51           6/27/2023    11:18 AM   Diet   What type of water? (!) BOTTLED   In past 12 months, concerned food might run out Never true   In past 12 months, food has run out/couldn't afford more Never true         6/27/2023    11:18 AM   Diet   Do you have questions about your eating?  No   Do you have questions about your weight?  No   What do you regularly drink? Water   What type of water? (!) BOTTLED   Do you think you eat healthy foods? Yes   At least 3 servings of food or beverages that have calcium each day? Yes   How would you describe your diet?  No restrictions    (!) BREAKFAST SKIPPED   In past 12 months, concerned food might run out Never true   In past 12 months, food has run out/couldn't afford more Never true         6/27/2023    11:18 AM   Activity   Days per week of moderate/strenuous exercise 1 day   On average, how many minutes do you engage in exercise at this level? 60 minutes   What do you do for exercise? swim   What activities are you involved with? n/a         6/27/2023    11:18 AM   Media Use   Hours per day of screen time (for entertainment) 2         6/27/2023    11:18 AM   Sleep   Do you have any trouble with sleep? No         6/27/2023    11:18 AM   School   Are you in school? Yes   What school do you attend?  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  Regular          Objective     Exam  BP 97/64   Pulse 97   Temp 98.3  F (36.8  C) (Oral)   Resp 19   Ht 1.74 m (5' 8.5\")   Wt 79.4 kg (175 lb)   LMP 06/10/2023   SpO2 99%   BMI 26.22 kg/m    95 %ile (Z= 1.65) based on CDC (Girls, 2-20 Years) Stature-for-age data based on Stature recorded on 6/27/2023.  93 %ile (Z= 1.49) based on CDC (Girls, 2-20 Years) weight-for-age data using vitals from 6/27/2023.  84 %ile (Z= 1.00) based on CDC (Girls, 2-20 Years) BMI-for-age based on BMI available as of 6/27/2023.  Blood pressure %rhonda are not available for patients who are 18 years or older.    Vision Screen  Vision Screen Details  Reason Vision Screen Not Completed: Patient had exam in last 12 months    Hearing Screen  RIGHT EAR  1000 Hz on Level 40 dB (Conditioning sound): Pass  1000 Hz on Level 20 dB: Pass  2000 Hz on Level 20 dB: Pass  4000 Hz on Level 20 dB: Pass  6000 Hz on Level 20 dB: Pass  8000 Hz on Level 20 dB: Pass  LEFT EAR  8000 Hz on Level 20 dB: Pass  6000 Hz on Level 20 dB: Pass  4000 Hz on Level 20 dB: Pass  2000 Hz on Level 20 dB: Pass  1000 Hz on Level 20 dB: Pass  500 Hz on Level 25 dB: Pass  RIGHT EAR  500 Hz on Level 25 dB: Pass  Results  Hearing Screen Results: Pass      Physical Exam  GENERAL: Active, alert, in no acute distress.  SKIN: Clear. No significant rash, abnormal pigmentation or lesions  HEAD: Normocephalic  EYES: normal lids, conjunctivae, sclerae  EARS: Normal canals. Tympanic membranes are normal; gray and translucent.  NOSE: Normal without discharge.  MOUTH/THROAT: Clear. No oral lesions. Teeth without obvious " abnormalities.  NECK: Supple, no masses.  No thyromegaly.  LYMPH NODES: No adenopathy  LUNGS: Clear. No rales, rhonchi, wheezing or retractions  HEART: Regular rhythm. Normal S1/S2. No murmurs. Normal pulses.  ABDOMEN: Soft, non-tender, not distended, no masses or hepatosplenomegaly. Bowel sounds normal. Chest wall pain on palpation   NEUROLOGIC: No focal findings. Cranial nerves grossly intact: DTR's normal. Normal gait, strength and tone  EXTREMITIES: Full range of motion, no deformities  : Exam declined by parent/patient.  Reason for decline: Patient/Parental preference        JASIEL Garcia [facility]

## 2023-08-14 NOTE — PROGRESS NOTES
"SUBJECTIVE:  Patrice Bradley is a 16 year old P0 who presents to evaluate use of oral contraceptive pills. Her mother accompanies her.  She has been using Aviane and is happy with the regular periods, but has gained about 20 pounds and has been having intermittent chest discomfort.  She reports no risk of pregnancy, periods were painful prior to starting the pills.  She also wonders about fibroids, about endometriosis.        OBJECTIVE: /70 (BP Location: Left arm, Patient Position: Sitting, Cuff Size: Adult Regular)   Pulse 88   Ht 1.727 m (5' 8\")   Wt 80.1 kg (176 lb 9.6 oz)   LMP 08/19/2021 (Exact Date)   Breastfeeding No   BMI 26.85 kg/m   Patient was not otherwise examined.      ASSESSMENT: Weight gain after starting birth control pills.  Chest discomfort.    PLAN: We had an extensive discussion of risks and benefits of oral contraceptive pills.  We discussed endometriosis and fibroids.  Discussed difficulty of diagnosis of endometriosis, discussed option of ultrasound to assess for fibroids.  We discussed weight gain, diet and exercise.  I would recommend that, given her symptoms, she begin a trial of progestin only birth control pills, but did caution her that she might experience irregular bleeding.  I recommended that she make an appointment with her primary provider Ms. Serrato to assess her other symptoms.  She will consider this advice, will let me know her decision    A total of 25 minutes were spent on the day of this visit in chart review, test review, lab review, discussion with the patient as noted above, and documentation of the visit.      " no

## 2024-05-28 ENCOUNTER — PATIENT OUTREACH (OUTPATIENT)
Dept: CARE COORDINATION | Facility: CLINIC | Age: 19
End: 2024-05-28
Payer: COMMERCIAL

## 2024-06-11 ENCOUNTER — PATIENT OUTREACH (OUTPATIENT)
Dept: CARE COORDINATION | Facility: CLINIC | Age: 19
End: 2024-06-11
Payer: COMMERCIAL

## 2024-06-27 DIAGNOSIS — R11.0 NAUSEA: ICD-10-CM

## 2024-06-28 RX ORDER — METOCLOPRAMIDE 5 MG/1
5 TABLET ORAL 4 TIMES DAILY PRN
Qty: 30 TABLET | Refills: 0 | Status: SHIPPED | OUTPATIENT
Start: 2024-06-28 | End: 2024-09-23

## 2024-08-31 ENCOUNTER — HEALTH MAINTENANCE LETTER (OUTPATIENT)
Age: 19
End: 2024-08-31

## 2024-09-20 DIAGNOSIS — R11.0 NAUSEA: ICD-10-CM

## 2024-09-23 RX ORDER — METOCLOPRAMIDE 5 MG/1
5 TABLET ORAL 4 TIMES DAILY PRN
Qty: 30 TABLET | Refills: 0 | Status: SHIPPED | OUTPATIENT
Start: 2024-09-23

## 2024-11-07 NOTE — PROGRESS NOTES
Patrice is a 15 year old who is being evaluated via a billable telephone visit.      What phone number would you like to be contacted at? [phone]  How would you like to obtain your AVS? Mail a copy    Patrice Bradley is a 15 year old female who has been using birth control pills since July 2020.  Her mother joins us on call today.  She reports that she has been feeling very well with the pills, notes an improvement in her menstrual periods.  She tried not taking the pills 1 month and had so much cramping that she resume use.  She has had no health changes, is planning endoscopy tomorrow.    We discussed continued use of oral contraceptive pills, risks and benefits.  Because she is being helped by the medication it seems reasonable to continue.  I will send a prescription for 1 year, and would ask her to call if she has problems, otherwise return to our office in 1 year to reassess.    ASSESSMENT: Dysmenorrhea.  Doing well with oral contraceptive pills.    PLAN: As above.    Call time: 11 minutes.     [Initial Evaluation] : an initial evaluation [FreeTextEntry1] : llq pain, gerd, colon cancer screening

## 2025-02-11 ENCOUNTER — MYC REFILL (OUTPATIENT)
Dept: FAMILY MEDICINE | Facility: CLINIC | Age: 20
End: 2025-02-11

## 2025-02-11 DIAGNOSIS — R10.2 PELVIC PAIN IN FEMALE: ICD-10-CM

## 2025-02-11 DIAGNOSIS — N94.6 DYSMENORRHEA: ICD-10-CM

## 2025-02-11 DIAGNOSIS — E55.9 VITAMIN D DEFICIENCY: Primary | ICD-10-CM

## 2025-02-11 DIAGNOSIS — R11.0 NAUSEA: ICD-10-CM

## 2025-02-11 RX ORDER — VITAMIN B COMPLEX
1 TABLET ORAL DAILY
Qty: 60 TABLET | Refills: 1 | Status: SHIPPED | OUTPATIENT
Start: 2025-02-11

## 2025-02-11 RX ORDER — NAPROXEN 250 MG/1
250-500 TABLET ORAL 2 TIMES DAILY PRN
Qty: 90 TABLET | Refills: 4 | OUTPATIENT
Start: 2025-02-11

## 2025-02-11 RX ORDER — METOCLOPRAMIDE 5 MG/1
5 TABLET ORAL 4 TIMES DAILY PRN
Qty: 30 TABLET | Refills: 0 | OUTPATIENT
Start: 2025-02-11

## (undated) DEVICE — SUCTION MANIFOLD NEPTUNE 2 SYS 4 PORT 0702-020-000

## (undated) DEVICE — TUBING ENDOGATOR HYBRID IRRIG 100610 EGP-100

## (undated) DEVICE — TUBING SUCTION MEDI-VAC 1/4"X20' N620A

## (undated) DEVICE — KIT ENDO TURNOVER/PROCEDURE CARRY-ON 101822

## (undated) DEVICE — SPECIMEN CONTAINER W/20ML 10% BUFF FORMALIN C4322-11

## (undated) DEVICE — SOL WATER IRRIG 1000ML BOTTLE 2F7114

## (undated) DEVICE — ENDO BITE BLOCK PEDS BATRIK LATEX FREE B1

## (undated) DEVICE — ENDO FORCEP ENDOJAW BIOPSY 2.8MMX230CM FB-220U

## (undated) DEVICE — KIT CONNECTOR FOR OLYMPUS ENDOSCOPES DEFENDO 100310

## (undated) RX ORDER — FENTANYL CITRATE 50 UG/ML
INJECTION, SOLUTION INTRAMUSCULAR; INTRAVENOUS
Status: DISPENSED
Start: 2021-02-25